# Patient Record
Sex: MALE | URBAN - METROPOLITAN AREA
[De-identification: names, ages, dates, MRNs, and addresses within clinical notes are randomized per-mention and may not be internally consistent; named-entity substitution may affect disease eponyms.]

---

## 2018-12-17 ENCOUNTER — OFFICE VISIT (OUTPATIENT)
Dept: UROLOGY | Facility: CLINIC | Age: 53
End: 2018-12-17
Payer: COMMERCIAL

## 2018-12-17 VITALS — OXYGEN SATURATION: 96 % | HEART RATE: 93 BPM | WEIGHT: 195 LBS | BODY MASS INDEX: 25.84 KG/M2 | HEIGHT: 73 IN

## 2018-12-17 DIAGNOSIS — F41.9 ANXIETY: ICD-10-CM

## 2018-12-17 DIAGNOSIS — N52.1 ERECTILE DYSFUNCTION DUE TO DISEASES CLASSIFIED ELSEWHERE: Primary | ICD-10-CM

## 2018-12-17 DIAGNOSIS — N52.9 ED (ERECTILE DYSFUNCTION): ICD-10-CM

## 2018-12-17 PROCEDURE — 84403 ASSAY OF TOTAL TESTOSTERONE: CPT | Performed by: UROLOGY

## 2018-12-17 PROCEDURE — 36415 COLL VENOUS BLD VENIPUNCTURE: CPT | Performed by: UROLOGY

## 2018-12-17 PROCEDURE — 99204 OFFICE O/P NEW MOD 45 MIN: CPT | Performed by: UROLOGY

## 2018-12-17 ASSESSMENT — PAIN SCALES - GENERAL: PAINLEVEL: NO PAIN (0)

## 2018-12-17 ASSESSMENT — MIFFLIN-ST. JEOR: SCORE: 1783.39

## 2018-12-17 NOTE — PROGRESS NOTES
History: It is a great pleasure to see this very pleasant 52-year-old gentleman in initial consultation today.  He is concerned about loss of libido and sexual performance over the last 6 or 7 months.  He has been put on the medication as Escitolapram for anxiety over this period of time.  There is been significant stress in his life and a job change.  He is having difficulty maintaining any significant erections, he has no nocturnal tumescence.  However there is no history of conditions such as diabetes or any form of neurologic disease or vascular disease.  His general health seems otherwise stable  He is     History reviewed. No pertinent past medical history.    Past Surgical History:   Procedure Laterality Date     VASECTOMY         History reviewed. No pertinent family history.    Social History     Socioeconomic History     Marital status:      Spouse name: Not on file     Number of children: Not on file     Years of education: Not on file     Highest education level: Not on file   Social Needs     Financial resource strain: Not on file     Food insecurity - worry: Not on file     Food insecurity - inability: Not on file     Transportation needs - medical: Not on file     Transportation needs - non-medical: Not on file   Occupational History     Not on file   Tobacco Use     Smoking status: Never Smoker     Smokeless tobacco: Never Used   Substance and Sexual Activity     Alcohol use: Not on file     Drug use: Not on file     Sexual activity: Not on file   Other Topics Concern     Parent/sibling w/ CABG, MI or angioplasty before 65F 55M? Not Asked   Social History Narrative     Not on file       No current outpatient medications on file.       Review Of Systems:  Skin: negative  Eyes: negative  Ears/Nose/Throat: negative  Respiratory: No shortness of breath, dyspnea on exertion, cough, or hemoptysis  Cardiovascular: negative  Gastrointestinal: negative  Genitourinary: negative  Musculoskeletal:  "negative  Neurologic: negative  Psychiatric: anxiety  Hematologic/Lymphatic/Immunologic: negative  Endocrine: negative    Exam:  Pulse 93   Ht 1.854 m (6' 1\")   Wt 88.5 kg (195 lb)   SpO2 96%   BMI 25.73 kg/m      General Impression: Very pleasant gentleman in no acute distress, well oriented in time place and person.    Mental status seems quite normal.    HEENT: There is no clinical evidence of jaundice in the mucous membranes are normal    Skin: The skin is normal to examination    Lymph Nodes: There is no inguinal or regional lymphadenopathy    Respiratory System: The respiratory cycle is normal    Cardiovascular: There is no significant pitting peripheral edema    Abdominal: Nonobese abdomen with no evidence of an inguinal hernia no other remarkable features    Extremities: Normal extremities    Back and Flank: No evidence of kyphosis, scoliosis or lordosis and otherwise normal examination    Genital: The penis is a little small, uncircumcised without evidence of plaque  Is the testes are of normal size with a small varicocele on the left side but no other remarkable features  Rectal: Not examined    Neurologic: There are no focal abnormal clinical neurological signs in the central, peripheral nervous systems    Impression: I had a careful discussion with the patient about the situation.  We did have a careful discussion also about different treatment options.  He is already been prescribed sildenafil which he has not yet started.  I did also have a discussion with him about other therapeutic options including other similar drugs, erect aid devices, intracorporeal pharmacotherapy and even penile prostheses.  It seems most likely from both my history and examination that this is a psychological etiology.  We will check a serum testosterone today and if this is low I would suggest consultation with endocrinologist  However at the present time I would stop the sildenafil he can start with 20 mg and can " "increase this gradually to 100 mg until effective.  It is my impression that the problem with erectile dysfunction is probably temporary and once the sildenafil is effective this will help restoration of normal erectile function.  He can have further discussions with us if progress is not being made.  I did discuss the entire situation with the patient in detail today.  I answered all his questions    Plan: I will see him on a as needed basis.  We will check the testosterone and I have asked him to call the office with regard to the results.    \"This dictation was performed with voice recognition software and may contain errors,  omissions and inadvertent word substitution.\"    "

## 2018-12-17 NOTE — LETTER
12/17/2018       RE: Zuhair Galeana  [address] [place] [address]     Dear Colleague,    Thank you for referring your patient, Zuhair Galeana, to the [facility] at [facility]. Please see a copy of my visit note below.    History: It is a great pleasure to see this very pleasant 52-year-old gentleman in initial consultation today.  He is concerned about loss of libido and sexual performance over the last 6 or 7 months.  He has been put on the medication as Escitolapram for anxiety over this period of time.  There is been significant stress in his life and a job change.  He is having difficulty maintaining any significant erections, he has no nocturnal tumescence.  However there is no history of conditions such as diabetes or any form of neurologic disease or vascular disease.  His general health seems otherwise stable  He is     History reviewed. No pertinent past medical history.    Past Surgical History:   Procedure Laterality Date     VASECTOMY         History reviewed. No pertinent family history.    Social History     Socioeconomic History     Marital status:      Spouse name: Not on file     Number of children: Not on file     Years of education: Not on file     Highest education level: Not on file   Social Needs     Financial resource strain: Not on file     Food insecurity - worry: Not on file     Food insecurity - inability: Not on file     Transportation needs - medical: Not on file     Transportation needs - non-medical: Not on file   Occupational History     Not on file   Tobacco Use     Smoking status: Never Smoker     Smokeless tobacco: Never Used   Substance and Sexual Activity     Alcohol use: Not on file     Drug use: Not on file     Sexual activity: Not on file   Other Topics Concern     Parent/sibling w/ CABG, MI or angioplasty before 65F 55M? Not Asked   Social History Narrative     Not on file       No  "current outpatient medications on file.     Exam:  Pulse 93   Ht 1.854 m (6' 1\")   Wt 88.5 kg (195 lb)   SpO2 96%   BMI 25.73 kg/m       General Impression: Very pleasant gentleman in no acute distress, well oriented in time place and person.    Mental status seems quite normal.    HEENT: There is no clinical evidence of jaundice in the mucous membranes are normal    Skin: The skin is normal to examination    Lymph Nodes: There is no inguinal or regional lymphadenopathy    Respiratory System: The respiratory cycle is normal    Cardiovascular: There is no significant pitting peripheral edema    Abdominal: Nonobese abdomen with no evidence of an inguinal hernia no other remarkable features    Extremities: Normal extremities    Back and Flank: No evidence of kyphosis, scoliosis or lordosis and otherwise normal examination    Genital: The penis is a little small, uncircumcised without evidence of plaque  Is the testes are of normal size with a small varicocele on the left side but no other remarkable features  Rectal: Not examined    Neurologic: There are no focal abnormal clinical neurological signs in the central, peripheral nervous systems    Impression: I had a careful discussion with the patient about the situation.  We did have a careful discussion also about different treatment options.  He is already been prescribed sildenafil which he has not yet started.  I did also have a discussion with him about other therapeutic options including other similar drugs, erect aid devices, intracorporeal pharmacotherapy and even penile prostheses.  It seems most likely from both my history and examination that this is a psychological etiology.  We will check a serum testosterone today and if this is low I would suggest consultation with endocrinologist  However at the present time I would stop the sildenafil he can start with 20 mg and can increase this gradually to 100 mg until effective.  It is my impression that the " "problem with erectile dysfunction is probably temporary and once the sildenafil is effective this will help restoration of normal erectile function.  He can have further discussions with us if progress is not being made.  I did discuss the entire situation with the patient in detail today.  I answered all his questions    Plan: I will see him on a as needed basis.  We will check the testosterone and I have asked him to call the office with regard to the results.    \"This dictation was performed with voice recognition software and may contain errors,  omissions and inadvertent word substitution.\"    Again, thank you for allowing me to participate in the care of your patient.      Sincerely,    Godwin Almodovar MD      "

## 2018-12-17 NOTE — NURSING NOTE
Chief Complaint   Patient presents with     Consult     New pt is here want to establish uro care.

## 2018-12-20 LAB — TESTOST SERPL-MCNC: 416 NG/DL (ref 240–950)
